# Patient Record
Sex: FEMALE | Race: WHITE | Employment: FULL TIME | URBAN - NONMETROPOLITAN AREA
[De-identification: names, ages, dates, MRNs, and addresses within clinical notes are randomized per-mention and may not be internally consistent; named-entity substitution may affect disease eponyms.]

---

## 2020-03-12 ENCOUNTER — HOSPITAL ENCOUNTER (OUTPATIENT)
Age: 45
Discharge: HOME | End: 2020-03-14
Payer: COMMERCIAL

## 2020-03-12 ENCOUNTER — HOSPITAL ENCOUNTER (OUTPATIENT)
Dept: GENERAL RADIOLOGY | Age: 45
Discharge: HOME | End: 2020-03-14
Payer: COMMERCIAL

## 2020-03-12 PROCEDURE — 73080 X-RAY EXAM OF ELBOW: CPT

## 2020-05-27 DIAGNOSIS — Z11.59 ENCOUNTER FOR SCREENING FOR OTHER VIRAL DISEASES: Primary | ICD-10-CM

## 2020-06-02 ENCOUNTER — HOSPITAL ENCOUNTER (OUTPATIENT)
Dept: PREADMISSION TESTING | Age: 45
Setting detail: SPECIMEN
Discharge: HOME | End: 2020-06-02
Payer: COMMERCIAL

## 2020-06-02 PROCEDURE — U0003 INFECTIOUS AGENT DETECTION BY NUCLEIC ACID (DNA OR RNA); SEVERE ACUTE RESPIRATORY SYNDROME CORONAVIRUS 2 (SARS-COV-2) (CORONAVIRUS DISEASE [COVID-19]), AMPLIFIED PROBE TECHNIQUE, MAKING USE OF HIGH THROUGHPUT TECHNOLOGIES AS DESCRIBED BY CMS-2020-01-R: HCPCS

## 2020-06-04 LAB — SARS-COV-2, NAA: NOT DETECTED

## 2020-06-05 ENCOUNTER — TELEPHONE (OUTPATIENT)
Dept: INTERNAL MEDICINE | Age: 45
End: 2020-06-05

## 2020-06-09 ENCOUNTER — HOSPITAL ENCOUNTER (OUTPATIENT)
Dept: SLEEP CENTER | Age: 45
Discharge: HOME | End: 2020-06-09
Payer: COMMERCIAL

## 2020-06-09 PROCEDURE — 95811 POLYSOM 6/>YRS CPAP 4/> PARM: CPT

## 2020-06-10 ASSESSMENT — SLEEP AND FATIGUE QUESTIONNAIRES
HOW LIKELY ARE YOU TO NOD OFF OR FALL ASLEEP WHEN YOU ARE A PASSENGER IN A CAR FOR AN HOUR WITHOUT A BREAK: 1
HOW LIKELY ARE YOU TO NOD OFF OR FALL ASLEEP WHILE LYING DOWN TO REST IN THE AFTERNOON WHEN CIRCUMSTANCES PERMIT: 1
NUMBER OF TIMES YOU WAKE PER NIGHT: 0
I SLEEP WELL: NO
ESS TOTAL SCORE: 11
NORMAL AMOUNT OF SLEEP PER NIGHT: 8
DO YOU SNORE: YES
FOR THE FIRST 30 MINUTES AFTER WAKING, I AM: SOMEWHAT DROWSY
HOW LIKELY ARE YOU TO NOD OFF OR FALL ASLEEP WHILE SITTING INACTIVE IN A PUBLIC PLACE: 3
HOW LIKELY ARE YOU TO NOD OFF OR FALL ASLEEP WHILE SITTING AND READING: 3
HOW MANY NAPS DO YOU TAKE PER WEEK: 2
HOW LIKELY ARE YOU TO NOD OFF OR FALL ASLEEP IN A CAR, WHILE STOPPED FOR A FEW MINUTES IN TRAFFIC: 0
USUAL AMOUNT OF TIME TO FALL ASLEEP (MIN): 2
WHAT TIME DO YOU USUALLY WAKE UP: 19800
HOW LIKELY ARE YOU TO NOD OFF OR FALL ASLEEP WHILE WATCHING TV: 2
HOW LIKELY ARE YOU TO NOD OFF OR FALL ASLEEP WHILE SITTING QUIETLY AFTER LUNCH WITHOUT ALCOHOL: 1
WHAT TIME DO YOU USUALLY GO TO BED: 36000
HOW LIKELY ARE YOU TO NOD OFF OR FALL ASLEEP WHILE SITTING AND TALKING TO SOMEONE: 0
DO YOU HAVE HIGH BLOOD PRESSURE: YES

## 2020-06-22 NOTE — PROGRESS NOTES
leading to an  arousal index of 26.4 per hour; 37 arousals were associated with  respiratory event, 2 arousals were associated with limb movement, and  there were 9 spontaneous arousals noted during this recording. LIMB MOVEMENT DATA:  There were 7 limb movements, corresponding to a  limb movement index of 3.9 per hour. EKG MONITORING:  Mean heart rate during wakefulness was 79 beats per  minute. IMPRESSION:  1. This patient does meet criteria for a severe obstructive sleep apnea  along with hypoxia and would benefit from CPAP titration. Therefore,  the patient had a split-night study. 2.  This patient would benefit from treatment of any other coexisting  medical condition including obesity, insomnia, anxiety or depression. 3.  Please correlate clinically. This is the titration polysomnogram portion of the study. SLEEP STAGING:  Total duration of this portion of the study was 256  minutes. Total sleep time was 216.7 minutes. This led to a sleep  efficiency of 84.7%. The patient had sleep latency in 7.7 minutes. Stage N1 sleep accounted for 7.2% of this portion of the study. Stage  N2 sleep accounted for 50.2% of this portion of the study, slow-wave  sleep accounted for 22.4% of this portion of the study, and REM sleep  accounted for 20.3% of this portion of the study. The latency to REM  sleep was 134 minutes. COMPREHENSIVE VENTILATORY MONITORING:  There were 7 central apneas, 2  obstructive apneas, no mixed apneas, and 19 hypopneas, leading to an  apnea-hypopnea index of 8 per hour. The average oxygenation while awake  was 99%, with minimum oxygen saturation down to 89%. AROUSAL DATA:  The patient had a total of 67 arousals leading to an  arousal index of 18.5 per hour; 16 arousals were associated with  respiratory event, 2 arousals were associated with limb movement, and  there were 47 spontaneous arousals noted during this recording.     LIMB MOVEMENT DATA:  There were a total of

## 2021-01-12 ENCOUNTER — HOSPITAL ENCOUNTER (OUTPATIENT)
Age: 46
Discharge: HOME | End: 2021-01-12
Payer: COMMERCIAL

## 2021-01-12 LAB
ANION GAP SERPL CALCULATED.3IONS-SCNC: 6 MMOL/L (ref 9–17)
BUN BLDV-MCNC: 19 MG/DL (ref 6–20)
BUN/CREAT BLD: 23 (ref 9–20)
CALCIUM SERPL-MCNC: 9.6 MG/DL (ref 8.6–10.4)
CHLORIDE BLD-SCNC: 104 MMOL/L (ref 98–107)
CHOLESTEROL/HDL RATIO: 4.6
CHOLESTEROL: 194 MG/DL
CO2: 28 MMOL/L (ref 20–31)
CREAT SERPL-MCNC: 0.81 MG/DL (ref 0.5–0.9)
GFR AFRICAN AMERICAN: >60 ML/MIN
GFR NON-AFRICAN AMERICAN: >60 ML/MIN
GFR SERPL CREATININE-BSD FRML MDRD: ABNORMAL ML/MIN/{1.73_M2}
GFR SERPL CREATININE-BSD FRML MDRD: ABNORMAL ML/MIN/{1.73_M2}
GLUCOSE BLD-MCNC: 113 MG/DL (ref 70–99)
HDLC SERPL-MCNC: 42 MG/DL
LDL CHOLESTEROL: 117 MG/DL (ref 0–130)
POTASSIUM SERPL-SCNC: 4.3 MMOL/L (ref 3.7–5.3)
SODIUM BLD-SCNC: 138 MMOL/L (ref 135–144)
TRIGL SERPL-MCNC: 175 MG/DL
TSH SERPL DL<=0.05 MIU/L-ACNC: 1.28 MIU/L (ref 0.3–5)
VLDLC SERPL CALC-MCNC: ABNORMAL MG/DL (ref 1–30)

## 2021-01-12 PROCEDURE — 84443 ASSAY THYROID STIM HORMONE: CPT

## 2021-01-12 PROCEDURE — 93005 ELECTROCARDIOGRAM TRACING: CPT | Performed by: FAMILY MEDICINE

## 2021-01-12 PROCEDURE — 80061 LIPID PANEL: CPT

## 2021-01-12 PROCEDURE — 80048 BASIC METABOLIC PNL TOTAL CA: CPT

## 2021-01-12 PROCEDURE — 36415 COLL VENOUS BLD VENIPUNCTURE: CPT

## 2021-01-13 LAB
EKG ATRIAL RATE: 75 BPM
EKG P AXIS: 63 DEGREES
EKG P-R INTERVAL: 138 MS
EKG Q-T INTERVAL: 384 MS
EKG QRS DURATION: 84 MS
EKG QTC CALCULATION (BAZETT): 428 MS
EKG R AXIS: 53 DEGREES
EKG T AXIS: 7 DEGREES
EKG VENTRICULAR RATE: 75 BPM

## 2021-01-13 PROCEDURE — 93010 ELECTROCARDIOGRAM REPORT: CPT | Performed by: INTERNAL MEDICINE

## 2022-01-06 ENCOUNTER — HOSPITAL ENCOUNTER (OUTPATIENT)
Dept: PREADMISSION TESTING | Age: 47
Setting detail: SPECIMEN
Discharge: HOME | End: 2022-01-06
Payer: COMMERCIAL

## 2022-01-06 LAB
DIRECT EXAM: NORMAL
DIRECT EXAM: NORMAL
Lab: NORMAL
SPECIMEN DESCRIPTION: NORMAL

## 2022-01-06 PROCEDURE — C9803 HOPD COVID-19 SPEC COLLECT: HCPCS

## 2022-01-06 PROCEDURE — U0005 INFEC AGEN DETEC AMPLI PROBE: HCPCS

## 2022-01-06 PROCEDURE — U0003 INFECTIOUS AGENT DETECTION BY NUCLEIC ACID (DNA OR RNA); SEVERE ACUTE RESPIRATORY SYNDROME CORONAVIRUS 2 (SARS-COV-2) (CORONAVIRUS DISEASE [COVID-19]), AMPLIFIED PROBE TECHNIQUE, MAKING USE OF HIGH THROUGHPUT TECHNOLOGIES AS DESCRIBED BY CMS-2020-01-R: HCPCS

## 2022-01-06 PROCEDURE — 87804 INFLUENZA ASSAY W/OPTIC: CPT

## 2022-01-07 LAB
SARS-COV-2: ABNORMAL
SARS-COV-2: DETECTED
SOURCE: ABNORMAL

## 2022-02-11 ENCOUNTER — HOSPITAL ENCOUNTER (EMERGENCY)
Age: 47
Discharge: HOME | End: 2022-02-11
Attending: EMERGENCY MEDICINE
Payer: COMMERCIAL

## 2022-02-11 VITALS
TEMPERATURE: 98.7 F | HEART RATE: 115 BPM | OXYGEN SATURATION: 95 % | DIASTOLIC BLOOD PRESSURE: 99 MMHG | HEIGHT: 66 IN | BODY MASS INDEX: 31.02 KG/M2 | RESPIRATION RATE: 19 BRPM | WEIGHT: 193 LBS | SYSTOLIC BLOOD PRESSURE: 150 MMHG

## 2022-02-11 DIAGNOSIS — N10 ACUTE PYELONEPHRITIS: Primary | ICD-10-CM

## 2022-02-11 LAB
-: ABNORMAL
AMORPHOUS: ABNORMAL
BACTERIA: ABNORMAL
BILIRUBIN URINE: NEGATIVE
CASTS UA: ABNORMAL /LPF
COLOR: YELLOW
COMMENT UA: ABNORMAL
CRYSTALS, UA: ABNORMAL /HPF
EPITHELIAL CELLS UA: ABNORMAL /HPF
GLUCOSE URINE: NEGATIVE
KETONES, URINE: NEGATIVE
LEUKOCYTE ESTERASE, URINE: ABNORMAL
MUCUS: ABNORMAL
NITRITE, URINE: POSITIVE
OTHER OBSERVATIONS UA: ABNORMAL
PH UA: 6 (ref 5–8)
PROTEIN UA: ABNORMAL
RBC UA: ABNORMAL /HPF (ref 0–2)
RENAL EPITHELIAL, UA: ABNORMAL /HPF
SPECIFIC GRAVITY UA: 1.01 (ref 1–1.03)
TRICHOMONAS: ABNORMAL
TURBIDITY: CLEAR
URINE HGB: ABNORMAL
UROBILINOGEN, URINE: NORMAL
WBC UA: ABNORMAL /HPF
YEAST: ABNORMAL

## 2022-02-11 PROCEDURE — 87088 URINE BACTERIA CULTURE: CPT

## 2022-02-11 PROCEDURE — 87086 URINE CULTURE/COLONY COUNT: CPT

## 2022-02-11 PROCEDURE — 6360000002 HC RX W HCPCS: Performed by: EMERGENCY MEDICINE

## 2022-02-11 PROCEDURE — 96372 THER/PROPH/DIAG INJ SC/IM: CPT

## 2022-02-11 PROCEDURE — 87186 SC STD MICRODIL/AGAR DIL: CPT

## 2022-02-11 PROCEDURE — 99283 EMERGENCY DEPT VISIT LOW MDM: CPT

## 2022-02-11 PROCEDURE — 6370000000 HC RX 637 (ALT 250 FOR IP): Performed by: EMERGENCY MEDICINE

## 2022-02-11 PROCEDURE — 81001 URINALYSIS AUTO W/SCOPE: CPT

## 2022-02-11 RX ORDER — CEFDINIR 300 MG/1
300 CAPSULE ORAL 2 TIMES DAILY
Qty: 20 CAPSULE | Refills: 0 | Status: SHIPPED | OUTPATIENT
Start: 2022-02-11 | End: 2022-02-21

## 2022-02-11 RX ORDER — ONDANSETRON 4 MG/1
4 TABLET, ORALLY DISINTEGRATING ORAL ONCE
Status: COMPLETED | OUTPATIENT
Start: 2022-02-11 | End: 2022-02-11

## 2022-02-11 RX ORDER — METHOCARBAMOL 750 MG/1
750 TABLET, FILM COATED ORAL 3 TIMES DAILY PRN
COMMUNITY

## 2022-02-11 RX ORDER — NAPROXEN 375 MG/1
375 TABLET ORAL 2 TIMES DAILY PRN
COMMUNITY
Start: 2021-05-11

## 2022-02-11 RX ORDER — MORPHINE SULFATE 4 MG/ML
6 INJECTION, SOLUTION INTRAMUSCULAR; INTRAVENOUS ONCE
Status: DISCONTINUED | OUTPATIENT
Start: 2022-02-11 | End: 2022-02-11

## 2022-02-11 RX ORDER — FAMOTIDINE 20 MG/1
20 TABLET, FILM COATED ORAL 2 TIMES DAILY
COMMUNITY

## 2022-02-11 RX ORDER — PROPRANOLOL HYDROCHLORIDE 10 MG/1
10 TABLET ORAL 2 TIMES DAILY
COMMUNITY

## 2022-02-11 RX ORDER — KETOROLAC TROMETHAMINE 30 MG/ML
30 INJECTION, SOLUTION INTRAMUSCULAR; INTRAVENOUS ONCE
Status: COMPLETED | OUTPATIENT
Start: 2022-02-11 | End: 2022-02-11

## 2022-02-11 RX ORDER — DEXTROAMPHETAMINE SACCHARATE, AMPHETAMINE ASPARTATE MONOHYDRATE, DEXTROAMPHETAMINE SULFATE AND AMPHETAMINE SULFATE 6.25; 6.25; 6.25; 6.25 MG/1; MG/1; MG/1; MG/1
25 CAPSULE, EXTENDED RELEASE ORAL DAILY
COMMUNITY
Start: 2021-09-22

## 2022-02-11 RX ORDER — ALBUTEROL SULFATE 90 UG/1
2 AEROSOL, METERED RESPIRATORY (INHALATION) EVERY 6 HOURS PRN
COMMUNITY

## 2022-02-11 RX ORDER — CEFDINIR 300 MG/1
300 CAPSULE ORAL ONCE
Status: COMPLETED | OUTPATIENT
Start: 2022-02-11 | End: 2022-02-11

## 2022-02-11 RX ORDER — HYDROCODONE BITARTRATE AND ACETAMINOPHEN 5; 325 MG/1; MG/1
1 TABLET ORAL EVERY 6 HOURS PRN
Qty: 12 TABLET | Refills: 0 | Status: SHIPPED | OUTPATIENT
Start: 2022-02-11 | End: 2022-02-14

## 2022-02-11 RX ORDER — ORPHENADRINE CITRATE 30 MG/ML
60 INJECTION INTRAMUSCULAR; INTRAVENOUS ONCE
Status: COMPLETED | OUTPATIENT
Start: 2022-02-11 | End: 2022-02-11

## 2022-02-11 RX ORDER — ONDANSETRON 4 MG/1
4 TABLET, FILM COATED ORAL EVERY 8 HOURS PRN
COMMUNITY
Start: 2021-12-17

## 2022-02-11 RX ORDER — KETOROLAC TROMETHAMINE 10 MG/1
10 TABLET, FILM COATED ORAL EVERY 8 HOURS PRN
Qty: 15 TABLET | Refills: 0 | Status: SHIPPED | OUTPATIENT
Start: 2022-02-11

## 2022-02-11 RX ORDER — ESTRADIOL 0.5 MG/1
2 TABLET ORAL DAILY
COMMUNITY

## 2022-02-11 RX ORDER — METOPROLOL SUCCINATE 100 MG/1
100 TABLET, EXTENDED RELEASE ORAL DAILY
COMMUNITY
Start: 2021-07-21

## 2022-02-11 RX ADMIN — CEFDINIR 300 MG: 300 CAPSULE ORAL at 15:13

## 2022-02-11 RX ADMIN — ONDANSETRON 4 MG: 4 TABLET, ORALLY DISINTEGRATING ORAL at 13:32

## 2022-02-11 RX ADMIN — KETOROLAC TROMETHAMINE 30 MG: 30 INJECTION, SOLUTION INTRAMUSCULAR at 13:31

## 2022-02-11 RX ADMIN — HYDROMORPHONE HYDROCHLORIDE 1 MG: 1 INJECTION, SOLUTION INTRAMUSCULAR; INTRAVENOUS; SUBCUTANEOUS at 15:13

## 2022-02-11 RX ADMIN — ORPHENADRINE CITRATE 60 MG: 30 INJECTION INTRAMUSCULAR; INTRAVENOUS at 13:32

## 2022-02-11 ASSESSMENT — PAIN SCALES - GENERAL
PAINLEVEL_OUTOF10: 10
PAINLEVEL_OUTOF10: 10

## 2022-02-11 NOTE — LETTER
[facility] ED  [address], [address]  Phone: [phone]               February 11, 2022    Patient: Angi Peres   YOB: 1975   Date of Visit: 2/11/2022       To Whom It May Concern:    Alirio Kc was seen and treated in our emergency department on 2/11/2022. She may return to work on 2/15/2022.       Sincerely,       Eitan Agarwal RN         Signature:__________________________________

## 2022-02-11 NOTE — ED PROVIDER NOTES
Urinalysis Comments         Microscopic Urinalysis   Result Value Ref Range    -          WBC, UA 10 TO 20 0 /HPF    RBC, UA 0 TO 2 0 - 2 /HPF    Casts UA NOT REPORTED /LPF    Crystals, UA NOT REPORTED None /HPF    Epithelial Cells UA 5 TO 10 /HPF    Renal Epithelial, UA 0 TO 2 0 /HPF    Bacteria, UA 3+ (A) None    Mucus, UA 1+ (A) None    Trichomonas, UA NOT REPORTED None    Amorphous, UA NOT REPORTED None    Other Observations UA NOT REPORTED NOT REQ. Yeast, UA NOT REPORTED None       RADIOLOGY:  Interpreted by Radiologist.  No orders to display       ------------------------- NURSING NOTES AND VITALS REVIEWED ---------------------------   The nursing notes within the ED encounter and vital signs as below have been reviewed. BP (!) 150/99   Pulse 115   Temp 98.7 °F (37.1 °C) (Oral)   Resp 19   Ht 5' 6\" (1.676 m)   Wt 193 lb (87.5 kg)   SpO2 95%   BMI 31.15 kg/m²   Oxygen Saturation Interpretation: Normal      ---------------------------------------------------PHYSICAL EXAM--------------------------------------      Constitutional/General: Alert and oriented x3, well appearing, non toxic in mild distress secondary to pain  Head: NC/AT  Eyes: PERRL, EOMI  Mouth: Oropharynx clear, handling secretions, no trismus  Neck: Supple, full ROM, no meningeal signs  Pulmonary: Lungs clear to auscultation bilaterally, no wheezes, rales, or rhonchi. Not in respiratory distress  Cardiovascular:  Regular rate and rhythm, no murmurs, gallops, or rubs. 2+ distal pulses  Abdomen: Soft, non tender, non distended,   Extremities: Moves all extremities x 4. Warm and well perfused  Skin: warm and dry without rash  Neurologic: GCS 15,  Psych: Normal Affect  Low back:  Moderate left-sided tenderness to palpation lumbar paraspinal with spasm    ------------------------------ ED COURSE/MEDICAL DECISION MAKING----------------------  Medications   HYDROmorphone (DILAUDID) injection 1 mg (has no administration in time range) cefdinir (OMNICEF) capsule 300 mg (has no administration in time range)   ketorolac (TORADOL) injection 30 mg (30 mg IntraMUSCular Given 2/11/22 1331)   orphenadrine (NORFLEX) injection 60 mg (60 mg IntraMUSCular Given 2/11/22 1332)   ondansetron (ZOFRAN-ODT) disintegrating tablet 4 mg (4 mg Oral Given 2/11/22 1332)         Medical Decision Making:    Suspected lumbar strain    Counseling: The emergency provider has spoken with the patient and discussed todays results, in addition to providing specific details for the plan of care and counseling regarding the diagnosis and prognosis. Questions are answered at this time and they are agreeable with the plan.      --------------------------------- IMPRESSION AND DISPOSITION ---------------------------------    IMPRESSION  1.  Acute pyelonephritis New Problem       DISPOSITION  Disposition: Discharge to home  Patient condition is stable                  Verner Fleischer, MD  02/11/22 4540

## 2022-02-13 LAB
CULTURE: ABNORMAL
Lab: ABNORMAL
SPECIMEN DESCRIPTION: ABNORMAL

## 2022-04-14 ENCOUNTER — HOSPITAL ENCOUNTER (OUTPATIENT)
Dept: VASCULAR LAB | Age: 47
Discharge: HOME | End: 2022-04-16
Payer: COMMERCIAL

## 2022-04-14 DIAGNOSIS — I83.893 VARICOSE VEINS OF BILATERAL LOWER EXTREMITIES WITH OTHER COMPLICATIONS: ICD-10-CM

## 2022-04-14 PROCEDURE — 93970 EXTREMITY STUDY: CPT

## 2022-10-27 ENCOUNTER — HOSPITAL ENCOUNTER (OUTPATIENT)
Dept: PHYSICAL THERAPY | Age: 47
Setting detail: THERAPIES SERIES
Discharge: HOME | End: 2022-10-27
Payer: COMMERCIAL

## 2022-10-27 PROCEDURE — 97162 PT EVAL MOD COMPLEX 30 MIN: CPT

## 2022-10-27 ASSESSMENT — PAIN SCALES - GENERAL: PAINLEVEL_OUTOF10: 4

## 2022-10-28 NOTE — PLAN OF CARE
[facility]       Phone: [phone]   Date: 10/28/2022                      Outpatient Physical Therapy  Fax: [phone]    ACCT #: [de-identified]                     Plan of Care  [facility]#: [id number]  Patient: Reggie Pope  : 1975    Referring Provider: Dr. Yung Gonzales    Diagnosis: Back pain     Treatment Diagnosis: Right lumbar/hip pain with radiculopathy    Assessment: Patient presents with right hip/lumbar pain with radicular right medial calf pain. Plan  to progress with hip and trunk stretching and strengthening as well as manual therapy and/or pelvic traction  Therapy Prognosis: Good    Treatment Plan :   Days: 2 times per week Weeks: 8 weeks      Patient Education/HEP, Therapeutic Exercise, Manual Therapy: Myofacial Release/Cupping, Traction, and HP/CP    Goals:  Time Frame for Short Term Goals: 5 visits  Short Term Goal 1: Educate on home program for hip and LE stretching and core stab ex    Time Frame for Long Term Goals : 12 visits  Long Term Goal 1: Decrease subjective right hip/lumbar pain to < 3/10 with standing and sitting activities  Long Term Goal 2: Decrease subjective frequency and/or severity of right LE radicular calf pain by > 50 %     MICHELLE FLEMING PT   Date: 10/28/2022    ______________________________________ Date: 10/28/2022  Physician Signature  By signing above or cosigning electronically, I have reviewed this Plan of Care and certify a need for medically necessary rehabilitation services.

## 2022-10-28 NOTE — THERAPY EVALUATION
Phone: [address] [facility]         Fax: [phone]                      Outpatient Physical Therapy                                                                      Evaluation    Date: 10/28/2022  Patient: Rafita Mendez  : 1975  ACCT #: [de-identified]    Referring Provider : Dr. Marce Gee    Diagnosis: Back pain    Treatment Diagnosis: Right lumbar/hip pain with radiculopathy  PT Insurance Information: Trevor Osler    Per Physician Order  Total # of Visits to Date: 1  No Show: 0  Canceled Appointment: 0     Subjective     Additional Pertinent Hx: Chronic LBP  with 1 year onset of right LE medial calf pain/numbness and toes turning red. Has seen a variety of physicians without cause found. Pain intermittent but increases with any position of standing, walking or sitting. Also notes right hip/gluteal pain with intermitten radiating pain to right groin region. PMHX incldues SOB, HTN, asthma, arthritis, headaches, learning issues  Oswestry = 21/50   Attempting to get MRI  Pain Assessment  Pain Level: 4     IADL History  Active : Yes  Occupation: Full time employment  Type of occupation: [employer] - sitting majority of day however can occasionally move around  Leisure & Hobbies: Completes most household tasks but takes extra breaks;  mowing and moppin most involved    Objective  Vision  Vision: Within Functional Limits  Hearing  Hearing: Within functional limits  Observation/Palpation  Posture: Fair  Palpation: Mild tenderness of right SI region  Observation: No asymetry of iliac crest or noted LLD in supine  Spine  Lumbar:  Forward flex to touch knees with discomfort;  SB and rotations WFL and essentially pain-free  Strength RLE  Strength RLE: WFL  Comment: No myotomal weakness  AROM RLE (degrees)  RLE AROM: WFL  Strength LLE  Strength LLE: WFL  Comment: No myotomal weakness  AROM LLE (degrees)  LLE AROM : WFL       AROM RLE (degrees)  RLE AROM: WFL  AROM LLE

## 2022-11-01 ENCOUNTER — HOSPITAL ENCOUNTER (OUTPATIENT)
Dept: PHYSICAL THERAPY | Age: 47
Setting detail: THERAPIES SERIES
Discharge: HOME | End: 2022-11-01
Payer: COMMERCIAL

## 2022-11-01 PROCEDURE — 97012 MECHANICAL TRACTION THERAPY: CPT

## 2022-11-01 PROCEDURE — 97110 THERAPEUTIC EXERCISES: CPT

## 2022-11-01 ASSESSMENT — PAIN SCALES - GENERAL: PAINLEVEL_OUTOF10: 3

## 2022-11-01 NOTE — PROGRESS NOTES
Physical Therapy    Phone: Chela Maldonado      Fax: [phone]                            Outpatient Physical Therapy                                                                            Daily Note    Date: 2022  Patient Name: Delma Hernandez        MRN: [id number]   ACCT#:  [de-identified]  : 1975  (52 y.o.)    Referring Provider (secondary): Dr. Cristela Stephenson         Diagnosis: Back pain  Treatment Diagnosis: Right lumbar/hip pain with radiculopathy    PT Insurance Information: Cathryn Goetz    Per Physician Order  Total # of Visits to Date: 2  No Show: 0  Canceled Appointment: 0  Plan of Care/Certification Expiration Date: 12/15/22    Pre-Treatment Pain:  3/10     Assessment  Assessment: Patient reports last night was bad and had difficutly sleeping. Currently states toes are burning but this is normal and rates this pain as 3/10. Due to time constraits today reviewed HEP and added exercises as outlined above. Held manual but progressed with intermittent pelvic traction. Will add maual next visit to help with hip issues.     Plan  Continue with current plan of care    Exercises/Modalities/Manual:  See DocFlow Sheet    Education:           Goals  (Total # of Visits to Date: 2)   Short Term Goals  Time Frame for Short Term Goals: 5 visits  Short Term Goal 1: Educate on home program for hip and LE stretching and core stab ex    Long Term Goals  Time Frame for Long Term Goals : 12 visits  Long Term Goal 1: Decrease subjective right hip/lumbar pain to < 3/10 with standing and sitting activities  Long Term Goal 2: Decrease subjective frequency and/or severity of right LE radicular calf pain by > 50 %    Post Treatment Pain:  5/10    Time In: 08    Time Out : 903         Total Time: 22 Minutes    Total Treatment Time: 32 Minutes    Antonette Ybarra PTA    Date: 2022

## 2022-11-03 ENCOUNTER — HOSPITAL ENCOUNTER (OUTPATIENT)
Dept: PHYSICAL THERAPY | Age: 47
Setting detail: THERAPIES SERIES
Discharge: HOME | End: 2022-11-03
Payer: COMMERCIAL

## 2022-11-03 PROCEDURE — 97110 THERAPEUTIC EXERCISES: CPT

## 2022-11-03 PROCEDURE — 97140 MANUAL THERAPY 1/> REGIONS: CPT

## 2022-11-03 PROCEDURE — 97012 MECHANICAL TRACTION THERAPY: CPT

## 2022-11-03 ASSESSMENT — PAIN SCALES - GENERAL: PAINLEVEL_OUTOF10: 3

## 2022-11-03 NOTE — PROGRESS NOTES
Physical Therapy  Phone: Chela Maldonado      Fax: [phone]                            Outpatient Physical Therapy                                                                            Daily Note    Date: 11/3/2022  Patient Name: Kash Yan        MRN: [id number]   ACCT#:  [de-identified]  : 1975  (52 y.o.)    Referring Provider (secondary): Dr. Thomas Bennett         Diagnosis: Back pain  Treatment Diagnosis: Right lumbar/hip pain with radiculopathy    PT Insurance Information: Niles Krueger    Per Physician Order  Total # of Visits to Date: 3  No Show: 0  Canceled Appointment: 0  Plan of Care/Certification Expiration Date: 12/15/22    Pre-Treatment Pain:  3/10     Assessment  Assessment: Patient rates pain today as 3/10. Reports being in pain since previous visit. Completes exercises as outlined above. Began manual therapy to R hip prior to traction due to the fact that the pelvic traction is not effective on the hip issues. Decreased pull on traction to 45# x 10 minutes.     Plan  Continue with current plan of care    Exercises/Modalities/Manual:  See DocFlow Sheet    Education:           Goals  (Total # of Visits to Date: 3)   Short Term Goals  Time Frame for Short Term Goals: 5 visits  Short Term Goal 1: Educate on home program for hip and LE stretching and core stab ex    Long Term Goals  Time Frame for Long Term Goals : 12 visits  Long Term Goal 1: Decrease subjective right hip/lumbar pain to < 3/10 with standing and sitting activities  Long Term Goal 2: Decrease subjective frequency and/or severity of right LE radicular calf pain by > 50 %    Post Treatment Pain:  3/10    Time In: 0826    Time Out : 0910        Timed Code Treatment Minutes: 34 Minutes    Total Treatment Time: 40 Minutes    Kamille Ramirez PTA   Date: 11/3/2022

## 2022-11-07 ENCOUNTER — HOSPITAL ENCOUNTER (OUTPATIENT)
Dept: PHYSICAL THERAPY | Age: 47
Setting detail: THERAPIES SERIES
Discharge: HOME | End: 2022-11-07
Payer: COMMERCIAL

## 2022-11-07 PROCEDURE — 97012 MECHANICAL TRACTION THERAPY: CPT

## 2022-11-07 PROCEDURE — 97110 THERAPEUTIC EXERCISES: CPT

## 2022-11-07 PROCEDURE — 97140 MANUAL THERAPY 1/> REGIONS: CPT

## 2022-11-07 ASSESSMENT — PAIN SCALES - GENERAL: PAINLEVEL_OUTOF10: 3

## 2022-11-07 ASSESSMENT — PAIN DESCRIPTION - LOCATION: LOCATION: BACK

## 2022-11-07 NOTE — PROGRESS NOTES
Physical Therapy  Phone: Chela Maldonado      Fax: [phone]                            Outpatient Physical Therapy                                                                            Daily Note    Date: 2022  Patient Name: Ramakrishna Reinoso        MRN: [id number]   ACCT#:  [de-identified]  : 1975  (52 y.o.)    Referring Provider (secondary): Dr. Abril Goodman         Diagnosis: Back pain  Treatment Diagnosis: Right lumbar/hip pain with radiculopathy    PT Insurance Information: Kwesi Hall    Per Physician Order  Total # of Visits to Date: 4  No Show: 0  Canceled Appointment: 0  Plan of Care/Certification Expiration Date: 12/15/22    Pre-Treatment Pain:  3/10     Assessment  Assessment: Patient rates pain in back as 3/10. C/o pain in R medial lower leg rating this pain as 6/10. Pain began last night and not sure why. Completes exercises as stated above. Manual therapy to R hip prior to pelvic traction due to traction is not effective on R hip  issues.     Plan  Continue with current plan of care    Exercises/Modalities/Manual:  See DocFlow Sheet    Education:           Goals  (Total # of Visits to Date: 4)   Short Term Goals  Time Frame for Short Term Goals: 5 visits  Short Term Goal 1: Educate on home program for hip and LE stretching and core stab ex    Long Term Goals  Time Frame for Long Term Goals : 12 visits  Long Term Goal 1: Decrease subjective right hip/lumbar pain to < 3/10 with standing and sitting activities  Long Term Goal 2: Decrease subjective frequency and/or severity of right LE radicular calf pain by > 50 %    Post Treatment Pain:  1/10    Time In: 0911    Time Out : 5444        Timed Code Treatment Minutes: 26 Minutes    Total Treatment Time: 39 Minutes    Marko Levi PTA    Date: 2022

## 2022-11-09 ENCOUNTER — HOSPITAL ENCOUNTER (OUTPATIENT)
Dept: PHYSICAL THERAPY | Age: 47
Setting detail: THERAPIES SERIES
Discharge: HOME | End: 2022-11-09
Payer: COMMERCIAL

## 2022-11-09 PROCEDURE — 97012 MECHANICAL TRACTION THERAPY: CPT

## 2022-11-09 PROCEDURE — 97140 MANUAL THERAPY 1/> REGIONS: CPT

## 2022-11-09 PROCEDURE — 97110 THERAPEUTIC EXERCISES: CPT

## 2022-11-09 ASSESSMENT — PAIN SCALES - GENERAL: PAINLEVEL_OUTOF10: 3

## 2022-11-09 NOTE — PROGRESS NOTES
Physical Therapy  Phone: Chela Maldonado      Fax: [phone]                            Outpatient Physical Therapy                                                                            Daily Note    Date: 2022  Patient Name: Tasia Woods        MRN: [id number]   ACCT#:  [de-identified]  : 1975  (52 y.o.)    Referring Provider (secondary): Dr. Yolanda Denney         Diagnosis: Back pain  Treatment Diagnosis: Right lumbar/hip pain with radiculopathy    PT Insurance Information: Moris Carroll 150    Per Physician Order  Total # of Visits to Date: 5  No Show: 0  Canceled Appointment: 0  Plan of Care/Certification Expiration Date: 12/15/22    Pre-Treatment Pain:  3/10     Assessment  Assessment: Patient rates pain today as 3/10. Reports pain in her back can get up to 6-7/10. Continued with exercises as outlined above and added bridges and LTR to HEP. Manual therapy to R hip prior to pelvic traction due to traction is not effective on R hip issues. States  she is not really having pain following session but feels as if her hip is out. Will try to check next session.     Plan  Continue with current plan of care    Exercises/Modalities/Manual:  See DocFlow Sheet    Education: Issued HEP of bridges and LTR          Goals  (Total # of Visits to Date: 5)   Short Term Goals  Time Frame for Short Term Goals: 5 visits  Short Term Goal 1: Educate on home program for hip and LE stretching and core stab ex-MET    Long Term Goals  Time Frame for Long Term Goals : 12 visits  Long Term Goal 1: Decrease subjective right hip/lumbar pain to < 3/10 with standing and sitting activities  Long Term Goal 2: Decrease subjective frequency and/or severity of right LE radicular calf pain by > 50 %    Post Treatment Pain:  1/10    Time In: 0835    Time Out : 0920        Timed Code Treatment Minutes: 30 Minutes    Total Treatment Time: 45 Minutes    Ivet Logan     Date: 2022

## 2022-11-14 ENCOUNTER — HOSPITAL ENCOUNTER (OUTPATIENT)
Dept: PHYSICAL THERAPY | Age: 47
Setting detail: THERAPIES SERIES
Discharge: HOME | End: 2022-11-14
Payer: COMMERCIAL

## 2022-11-14 PROCEDURE — 97012 MECHANICAL TRACTION THERAPY: CPT

## 2022-11-14 PROCEDURE — 97110 THERAPEUTIC EXERCISES: CPT

## 2022-11-14 PROCEDURE — 97140 MANUAL THERAPY 1/> REGIONS: CPT

## 2022-11-14 ASSESSMENT — PAIN SCALES - GENERAL: PAINLEVEL_OUTOF10: 3

## 2022-11-14 NOTE — PROGRESS NOTES
Physical Therapy    Phone: Chela Maldonado      Fax: [phone]                            Outpatient Physical Therapy                                                                            Daily Note    Date: 2022  Patient Name: Phillip Elliott        MRN: [id number]   ACCT#:  [de-identified]  : 1975  (52 y.o.)    Referring Provider (secondary): Dr. Syd Spaulding         Diagnosis: Back pain  Treatment Diagnosis: Right lumbar/hip pain with radiculopathy    PT Insurance Information: Tequila Ferreira    Per Physician Order  Total # of Visits to Date: 6  No Show: 0  Canceled Appointment: 0  Plan of Care/Certification Expiration Date: 12/15/22    Pre-Treatment Pain:  3/10     Assessment  Assessment: Patient rates pain today in back as 3/10. Rates pain and radiating pain into R calf yesterday was horrible. Not close yet to achieving LTG #2. Continued with exercises as stated followed by manual therapy to R hip prior to pelvic traction due to traction is not effective on R hip issues. Following traction, patient states that she is walking faster. Continues to rate pain as 3/10.     Plan  Continue with current plan of care    Exercises/Modalities/Manual:  See DocFlow Sheet    Education:           Goals  (Total # of Visits to Date: 6)   Short Term Goals  Time Frame for Short Term Goals: 5 visits  Short Term Goal 1: Educate on home program for hip and LE stretching and core stab ex-MET    Long Term Goals  Time Frame for Long Term Goals : 12 visits  Long Term Goal 1: Decrease subjective right hip/lumbar pain to < 3/10 with standing and sitting activities  Long Term Goal 2: Decrease subjective frequency and/or severity of right LE radicular calf pain by > 50 %    Post Treatment Pain:  3/10    Time In: 0834    Time Out : 0911        Timed Code Treatment Minutes: 23 Minutes    Total Treatment Time:  38 Minutes    Suzi Bates     Date: 2022

## 2022-11-21 ENCOUNTER — HOSPITAL ENCOUNTER (OUTPATIENT)
Dept: PHYSICAL THERAPY | Age: 47
Setting detail: THERAPIES SERIES
Discharge: HOME | End: 2022-11-21
Payer: COMMERCIAL

## 2022-11-21 PROCEDURE — 97012 MECHANICAL TRACTION THERAPY: CPT

## 2022-11-21 PROCEDURE — 97110 THERAPEUTIC EXERCISES: CPT

## 2022-11-21 ASSESSMENT — PAIN SCALES - GENERAL: PAINLEVEL_OUTOF10: 5

## 2022-11-21 NOTE — PROGRESS NOTES
Phone: Chela Maldonado      Fax: [phone]                            Outpatient Physical Therapy                                                                            Daily Note    Date: 2022  Patient Name: Binh Armendariz        MRN: [id number]   ACCT#:  [de-identified]  : 1975  (52 y.o.)    Referring Provider: Dr. Ubaldo Elam         Diagnosis: Back pain  Treatment Diagnosis: Right lumbar/hip pain with radiculopathy    PT Insurance Information: Kevin Antonias    Per Physician Order  Total # of Visits to Date: 7  No Show: 0  Canceled Appointment: 0  Plan of Care/Certification Expiration Date: 12/15/22    Pre-Treatment Pain:  5/10     Assessment  Assessment: Patient has completed 7 treatment sessions consisting of exercise/education on home program along with manual therapy and pelvic traction for lumbar decompression. She presents with right LS/hip pain with radiculopathy extending to the right foot. Overall, she reports no significant reduction in symptoms. Her pain is rated 5-9/10 with daily or work tasks. She is limited with lifting, cleaning/running sweeper, climbing or squatting. She also notes signficant sleep depreivation due to pain. Reflexed are decreased at L4 bilaterally. Based on her limtied improvement or reduction of symptoms, plan to hold/discharge further PT and refer back to MD for further diagnostic testing and/or treatment.     Plan  Hold pending MD assessment    Exercises/Modalities/Manual:  See DocFlow Sheet    Education:           Goals  (Total # of Visits to Date: 7)   Short Term Goals  Time Frame for Short Term Goals: 5 visits  Short Term Goal 1: Educate on home program for hip and LE stretching and core stab ex-MET    Long Term Goals  Time Frame for Long Term Goals : 12 visits  Long Term Goal 1: Decrease subjective right hip/lumbar pain to < 3/10 with standing and sitting activities-  NOT MET  Long Term Goal 2: Decrease subjective frequency and/or severity of right LE radicular calf pain by > 50 %- NOT MET    Post Treatment Pain:  4-5/10    Time In: 0815    Time Out : 0900        Timed Code Treatment Minutes: 20 Minutes    Total Treatment:  [address], PT     Date: 11/21/2022

## 2022-11-21 NOTE — PROGRESS NOTES
Phone: [address]            Fax: [phone]                             Outpatient Physical Therapy                                                                      Progress Report    Date: 2022   MRN: [id number]    ACCT#: [de-identified]  Patient: Perico Roberto  : 1975  Referring Provider: Dr. Slava Gant         Diagnosis: Back pain      Treatment Diagnosis: Right lumbar/hip pain with radiculopathy    PT Insurance Information: Moris Carroll 150    Per Physician Order  Total # of Visits to Date: 7  No Show: 0  Canceled Appointment: 0    Assessment: Patient has completed 7 treatment sessions consisting of exercise/education on home program along with manual therapy and pelvic traction for lumbar decompression. She presents with right LS/hip pain with radiculopathy extending to the right foot. Overall, she reports no significant reduction in symptoms. Her pain is rated 5-9/10 with daily or work tasks. She is limited with standing, bending/lifting, cleaning/running sweeper, climbing or squatting. She also notes signficant sleep deprivation due to pain. Reflexed are decreased at L4 bilaterally. Based on her limted improvement or reduction of symptoms, plan to hold/discharge further PT and refer back to MD for further diagnostic testing and/or treatment.       Plan:   Hold pending MD assessment    Goals  Short Term Goals  Time Frame for Short Term Goals: 5 visits  Short Term Goal 1: Educate on home program for hip and LE stretching and core stab ex-MET    Long Term Goals  Time Frame for Long Term Goals : 12 visits  Long Term Goal 1: Decrease subjective right hip/lumbar pain to < 3/10 with standing and sitting activities-  NOT MET  Long Term Goal 2: Decrease subjective frequency and/or severity of right LE radicular calf pain by > 50 %- NOT MET    MICHELLE FLEMING, PT    Date: 2022

## 2023-02-06 ENCOUNTER — HOSPITAL ENCOUNTER (EMERGENCY)
Age: 48
Discharge: HOME | End: 2023-02-06
Attending: EMERGENCY MEDICINE
Payer: COMMERCIAL

## 2023-02-06 ENCOUNTER — APPOINTMENT (OUTPATIENT)
Dept: GENERAL RADIOLOGY | Age: 48
End: 2023-02-06
Payer: COMMERCIAL

## 2023-02-06 VITALS
HEIGHT: 66 IN | DIASTOLIC BLOOD PRESSURE: 77 MMHG | OXYGEN SATURATION: 96 % | SYSTOLIC BLOOD PRESSURE: 117 MMHG | HEART RATE: 58 BPM | BODY MASS INDEX: 31.34 KG/M2 | WEIGHT: 195 LBS | RESPIRATION RATE: 14 BRPM | TEMPERATURE: 98.6 F

## 2023-02-06 DIAGNOSIS — J18.9 PNEUMONIA OF LEFT LOWER LOBE DUE TO INFECTIOUS ORGANISM: Primary | ICD-10-CM

## 2023-02-06 LAB
ABSOLUTE BANDS #: 0.1 K/UL (ref 0–1)
ABSOLUTE EOS #: 1.92 K/UL (ref 0–0.4)
ABSOLUTE LYMPH #: 2.4 K/UL (ref 1–4.8)
ABSOLUTE MONO #: 0.96 K/UL (ref 0–1)
ALBUMIN SERPL-MCNC: 3.9 G/DL (ref 3.5–5.2)
ALP SERPL-CCNC: 110 U/L (ref 35–104)
ALT SERPL-CCNC: 20 U/L (ref 5–33)
ANION GAP SERPL CALCULATED.3IONS-SCNC: 11 MMOL/L (ref 9–17)
AST SERPL-CCNC: 13 U/L
BANDS: 1 % (ref 0–10)
BASOPHILS # BLD: ABNORMAL % (ref 0–2)
BASOPHILS ABSOLUTE: ABNORMAL K/UL (ref 0–0.2)
BILIRUB SERPL-MCNC: 0.1 MG/DL (ref 0.3–1.2)
BUN SERPL-MCNC: 15 MG/DL (ref 6–20)
CALCIUM SERPL-MCNC: 9.3 MG/DL (ref 8.6–10.4)
CHLORIDE SERPL-SCNC: 103 MMOL/L (ref 98–107)
CO2 SERPL-SCNC: 27 MMOL/L (ref 20–31)
CREAT SERPL-MCNC: 0.78 MG/DL (ref 0.5–0.9)
D DIMER BLD IA.RAPID-MCNC: 0.53 MG/L FEU (ref 0–0.59)
EOSINOPHILS RELATIVE PERCENT: 20 % (ref 0–5)
GFR SERPL CREATININE-BSD FRML MDRD: >60 ML/MIN/1.73M2
GLUCOSE SERPL-MCNC: 135 MG/DL (ref 70–99)
HCT VFR BLD AUTO: 41.2 % (ref 36–46)
HGB BLD-MCNC: 13.8 G/DL (ref 12–16)
LYMPHOCYTES # BLD: 25 % (ref 15–40)
MCH RBC QN AUTO: 31 PG (ref 26–34)
MCHC RBC AUTO-ENTMCNC: 33.6 G/DL (ref 31–37)
MCV RBC AUTO: 92.3 FL (ref 80–100)
MONOCYTES # BLD: 10 % (ref 4–8)
MORPHOLOGY: ABNORMAL
PDW BLD-RTO: 12.9 % (ref 12.1–15.2)
PLATELET # BLD AUTO: 347 K/UL (ref 140–450)
POTASSIUM SERPL-SCNC: 4.4 MMOL/L (ref 3.7–5.3)
PROT SERPL-MCNC: 6.7 G/DL (ref 6.4–8.3)
RBC # BLD: 4.46 M/UL (ref 4–5.2)
SEG NEUTROPHILS: 44 % (ref 47–75)
SEGMENTED NEUTROPHILS ABSOLUTE COUNT: 4.22 K/UL (ref 2.5–7)
SODIUM SERPL-SCNC: 141 MMOL/L (ref 135–144)
TROPONIN I SERPL DL<=0.01 NG/ML-MCNC: 20 NG/L (ref 0–14)
TROPONIN I SERPL DL<=0.01 NG/ML-MCNC: 21 NG/L (ref 0–14)
WBC # BLD AUTO: 9.6 K/UL (ref 3.5–11)

## 2023-02-06 PROCEDURE — 93005 ELECTROCARDIOGRAM TRACING: CPT | Performed by: EMERGENCY MEDICINE

## 2023-02-06 PROCEDURE — 99284 EMERGENCY DEPT VISIT MOD MDM: CPT

## 2023-02-06 PROCEDURE — 85025 COMPLETE CBC W/AUTO DIFF WBC: CPT

## 2023-02-06 PROCEDURE — 84484 ASSAY OF TROPONIN QUANT: CPT

## 2023-02-06 PROCEDURE — 80053 COMPREHEN METABOLIC PANEL: CPT

## 2023-02-06 PROCEDURE — 36415 COLL VENOUS BLD VENIPUNCTURE: CPT

## 2023-02-06 PROCEDURE — 85379 FIBRIN DEGRADATION QUANT: CPT

## 2023-02-06 PROCEDURE — 71045 X-RAY EXAM CHEST 1 VIEW: CPT

## 2023-02-06 PROCEDURE — 99285 EMERGENCY DEPT VISIT HI MDM: CPT

## 2023-02-06 RX ORDER — LEVOFLOXACIN 500 MG/1
500 TABLET, FILM COATED ORAL DAILY
Qty: 10 TABLET | Refills: 0 | Status: SHIPPED | OUTPATIENT
Start: 2023-02-06 | End: 2023-02-16

## 2023-02-06 RX ORDER — CETIRIZINE HYDROCHLORIDE 10 MG/1
10 TABLET ORAL DAILY
COMMUNITY
Start: 2019-08-08

## 2023-02-06 ASSESSMENT — LIFESTYLE VARIABLES
HOW OFTEN DO YOU HAVE A DRINK CONTAINING ALCOHOL: NEVER
HOW MANY STANDARD DRINKS CONTAINING ALCOHOL DO YOU HAVE ON A TYPICAL DAY: PATIENT DOES NOT DRINK

## 2023-02-06 NOTE — LETTER
[facility]OSP [facility] ED  [address], [address]  Phone: [phone]               February 6, 2023    Patient: Lewis Solano   YOB: 1975   Date of Visit: 2/6/2023       To Whom It May Concern:    Brandy Santos was seen and treated in our emergency department on 2/6/2023. She may return to work on 2/10/23.       Sincerely,       Rich Tavarez RN         Signature:__________________________________

## 2023-02-06 NOTE — DISCHARGE INSTRUCTIONS
Take antibiotic as prescribed. Follow-up with your physician assistant. Return to ER for worsening symptoms.

## 2023-02-06 NOTE — ED PROVIDER NOTES
[facility] COMPLAINT    Chief Complaint   Patient presents with    Shortness of Breath     Pt reports feeling short of breath for 1 week. Worsens with exertion. HPI    Brandan Renteria is a 50 y.o. female who presents to the emergency room for evaluation of SOB with associated cough for the past 1 week. She states it is worse with exertion and lying down. No chest pain or other symptoms. No history of cardiac disease or CHF or emphysema. She is a smoker. PAST MEDICAL HISTORY    Past Medical History:   Diagnosis Date    Headache(784.0)     Hypertension        SURGICAL HISTORY    Past Surgical History:   Procedure Laterality Date    HYSTERECTOMY (CERVIX STATUS UNKNOWN)         CURRENT MEDICATIONS    Current Outpatient Rx   Medication Sig Dispense Refill    cetirizine (ZYRTEC) 10 MG tablet Take 10 mg by mouth daily      levoFLOXacin (LEVAQUIN) 500 MG tablet Take 1 tablet by mouth daily for 10 days 10 tablet 0    propranolol (INDERAL) 10 MG tablet Take 10 mg by mouth 2 times daily      ondansetron (ZOFRAN) 4 MG tablet Take 4 mg by mouth every 8 hours as needed      naproxen (NAPROSYN) 375 MG tablet Take 375 mg by mouth 2 times daily as needed (Patient not taking: Reported on 2/6/2023)      metoprolol succinate (TOPROL XL) 100 MG extended release tablet Take 100 mg by mouth daily      methocarbamol (ROBAXIN) 750 MG tablet Take 750 mg by mouth 3 times daily as needed      linaCLOtide (LINZESS) 72 MCG CAPS capsule Take 72 mcg by mouth      famotidine (PEPCID) 20 MG tablet Take 20 mg by mouth 2 times daily      amphetamine-dextroamphetamine (ADDERALL XR) 25 MG extended release capsule Take 25 mg by mouth daily.       albuterol sulfate  (90 Base) MCG/ACT inhaler Inhale 2 puffs into the lungs every 6 hours as needed      estradiol (ESTRACE) 0.5 MG tablet Take 2 mg by mouth daily      ketorolac (TORADOL) 10 MG tablet Take 1 tablet by mouth every 8 hours as needed for Pain 15 tablet 0    butalbital-acetaminophen-caffeine (FIORICET, ESGIC) -40 MG per tablet Take 2 tablets by mouth every 4 hours as needed for Pain. ALLERGIES    Allergies   Allergen Reactions    Fentanyl Rash    Adhesive Tape Itching    Diphenhydramine Other (See Comments)       FAMILY HISTORY    History reviewed. No pertinent family history. SOCIAL HISTORY    Social History     Socioeconomic History    Marital status:      Spouse name: None    Number of children: None    Years of education: None    Highest education level: None   Tobacco Use    Smoking status: Every Day     Packs/day: 0.50     Types: Cigarettes    Smokeless tobacco: Never   Substance and Sexual Activity    Alcohol use: Never    Drug use: Never           Review of Systems:  Constitutional:  Denies fever, chills, weight loss or weakness   Eyes:  Denies photophobia or discharge   HENT:  Denies sore throat or ear pain   Respiratory: Reports cough and SOB. Cardiovascular:  Denies chest pain, palpitations or swelling   GI:  Denies abdominal pain, nausea, vomiting, or diarrhea   Musculoskeletal:  Denies back pain   Skin:  Denies rash   Neurologic:  Denies headache, focal weakness or sensory changes   Endocrine:  Denies polyuria or polydypsia   Lymphatic:  Denies swollen glands   Psychiatric:  Denies depression, suicidal ideation or homicidal ideation   All systems negative except as marked. PHYSICAL EXAM    VITAL SIGNS: /76   Pulse 67   Temp 98.6 °F (37 °C) (Oral)   Resp 19   Ht 5' 6\" (1.676 m)   Wt 195 lb (88.5 kg)   SpO2 96%   BMI 31.47 kg/m²    Constitutional:  Well developed, Well nourished, No acute distress, Non-toxic appearance. HENT:  Normocephalic, Atraumatic, Bilateral external ears normal, Oropharynx moist, No oral exudates, Nose normal. Neck- Normal range of motion, No tenderness, Supple, No stridor. Eyes:  PERRL, EOMI, Conjunctiva normal, No discharge.    Respiratory:  Normal breath sounds, No respiratory distress, No wheezing, No chest tenderness. Cardiovascular:  Normal heart rate, Normal rhythm, No murmurs, No rubs, No gallops. GI:  Bowel sounds normal, Soft, No tenderness, No masses, No pulsatile masses. Integument:  Warm, Dry, No erythema, No rash. Lymphatic:  No lymphadenopathy noted. Neurologic:  Alert & oriented x 3, Normal motor function, Normal sensory function, No focal deficits noted. Labs  Labs Reviewed   CBC WITH AUTO DIFFERENTIAL - Abnormal; Notable for the following components:       Result Value    Seg Neutrophils 44 (*)     Monocytes 10 (*)     Eosinophils % 20 (*)     Absolute Eos # 1.92 (*)     All other components within normal limits   COMPREHENSIVE METABOLIC PANEL - Abnormal; Notable for the following components:    Glucose 135 (*)     Alkaline Phosphatase 110 (*)     Total Bilirubin 0.1 (*)     All other components within normal limits   TROPONIN - Abnormal; Notable for the following components:    Troponin, High Sensitivity 21 (*)     All other components within normal limits   TROPONIN - Abnormal; Notable for the following components:    Troponin, High Sensitivity 20 (*)     All other components within normal limits   D-DIMER, QUANTITATIVE       EKG Interpretation    Interpreted by emergency department physician    Rhythm: normal sinus   Rate: normal  Axis: normal  Ectopy: none  Conduction: normal  ST Segments: no acute change  T Waves: no acute change  Q Waves: none    Clinical Impression: no acute changes    Corby Segura DO    MDM: Patient presents for evaluation of shortness of breath for the past 1 week. Physical exam is normal.  Vital signs stable. Laboratory studies were independently reviewed by me and normal including 2 troponins and a D-dimer and white blood cell. Chest x-ray was independently reviewed by me and shows possible left lower lobe pneumonia. No evidence of sepsis at this time.   Differential diagnosis includes CHF versus ACS versus PE versus pneumonia. At this time patient will be discharged home with a prescription for Levaquin 500 mg daily for the next 10 days and follow-up with her doctor. Summation      Patient Course:     ED Medications administered this visit:  Medications - No data to display    New Prescriptions from this visit:    New Prescriptions    LEVOFLOXACIN (LEVAQUIN) 500 MG TABLET    Take 1 tablet by mouth daily for 10 days       Follow-up:  YENNY Nye  [facility] [id number]    Schedule an appointment as soon as possible for a visit         Final Impression:   1.  Pneumonia of left lower lobe due to infectious organism New Problem              (Please note that portions of this note were completed with a voice recognition program.  Efforts were made to edit the dictations but occasionally words are mis-transcribed.)         Shayne Gordon,   02/06/23 [id number]

## 2023-02-07 LAB
EKG ATRIAL RATE: 67 BPM
EKG P AXIS: 65 DEGREES
EKG P-R INTERVAL: 150 MS
EKG Q-T INTERVAL: 412 MS
EKG QRS DURATION: 78 MS
EKG QTC CALCULATION (BAZETT): 435 MS
EKG R AXIS: 34 DEGREES
EKG T AXIS: 40 DEGREES
EKG VENTRICULAR RATE: 67 BPM

## 2023-02-07 PROCEDURE — 93010 ELECTROCARDIOGRAM REPORT: CPT | Performed by: INTERNAL MEDICINE

## 2023-02-16 ENCOUNTER — TRANSCRIBE ORDERS (OUTPATIENT)
Dept: ADMINISTRATIVE | Age: 48
End: 2023-02-16

## 2023-02-16 DIAGNOSIS — R06.00 DYSPNEA, UNSPECIFIED TYPE: Primary | ICD-10-CM

## 2023-02-20 ENCOUNTER — HOSPITAL ENCOUNTER (OUTPATIENT)
Dept: PULMONOLOGY | Age: 48
Discharge: HOME | End: 2023-02-20
Payer: COMMERCIAL

## 2023-02-20 VITALS — OXYGEN SATURATION: 98 %

## 2023-02-20 DIAGNOSIS — R06.00 DYSPNEA, UNSPECIFIED TYPE: ICD-10-CM

## 2023-02-20 PROCEDURE — 94060 EVALUATION OF WHEEZING: CPT

## 2023-02-20 PROCEDURE — 94726 PLETHYSMOGRAPHY LUNG VOLUMES: CPT

## 2023-02-20 PROCEDURE — 6360000002 HC RX W HCPCS: Performed by: INTERNAL MEDICINE

## 2023-02-20 PROCEDURE — 94729 DIFFUSING CAPACITY: CPT

## 2023-02-20 RX ORDER — ALBUTEROL SULFATE 2.5 MG/3ML
2.5 SOLUTION RESPIRATORY (INHALATION) ONCE
Status: COMPLETED | OUTPATIENT
Start: 2023-02-20 | End: 2023-02-20

## 2023-02-20 RX ADMIN — ALBUTEROL SULFATE 2.5 MG: 2.5 SOLUTION RESPIRATORY (INHALATION) at 14:14

## 2023-02-21 PROCEDURE — 94729 DIFFUSING CAPACITY: CPT | Performed by: INTERNAL MEDICINE

## 2023-02-21 PROCEDURE — 94060 EVALUATION OF WHEEZING: CPT | Performed by: INTERNAL MEDICINE

## 2023-02-21 PROCEDURE — 94726 PLETHYSMOGRAPHY LUNG VOLUMES: CPT | Performed by: INTERNAL MEDICINE

## 2023-02-21 NOTE — PROCEDURES
[address]                               PULMONARY FUNCTION    PATIENT NAME: Jazmín Garcia                   :        1975  MED REC NO:   [id number]                              ROOM:  ACCOUNT NO:   [de-identified]                           ADMIT DATE: 2023  PROVIDER:     Deepak Rios    DATE OF PROCEDURE:  2023    PULMONARY FUNCTION TESTS WITH BRONCHODILATOR AND DLCO    ATTENDING PHYSICIAN:  Dr Yusuf Thomson. REASON FOR TEST:  Dyspnea. SUMMARY:  1. The patient's effort was reported as good. 2.  The forced vital capacity is decreased at 67% of predicted. 3.  The forced exhaled volume in 1 second is decreased at 67% of  predicted. 4.  The forced exhaled volume in 1 second/forced vital capacity is at  99% of predicted. 5.  The forced exhaled flow at 25% to 75% is decreased at 64% of  predicted. 6.  A borderline improvement of 11% was seen in the forced exhaled flow  at 25% to 75% after one-time dose of bronchodilator. An improvement in  the forced vital capacity and forced exhaled volume in 1 second was not  seen. 7.  Total lung capacity is normal at 84% of predicted. 8.  The residual volume/total lung capacity is increased at 129% of  predicted. 9.  The DLCO is at 68% of predicted, the DLCO/VA is at 86% of predicted. IMPRESSION:  1.  Pulmonary function test results suggest a moderately obstructive  pulmonary disease (emphysema). 2.  A significant improvement was seen only in the forced exhaled flow  at 25% to 75% after one-time dose of bronchodilator to suggest a  possible small airway disease (asthma), please correlate clinically. 3.  An increased residual volume/total lung capacity suggests air  trapping. 4.  The DLCO is mildly decreased.         MEGHAN RIOS    D: 2023 17:06:28       T: 2023 22:11:55     [id number]  Job#: [id number] Doc#: [id number]    CC:

## 2024-03-15 ENCOUNTER — HOSPITAL ENCOUNTER (OUTPATIENT)
Dept: MRI IMAGING | Age: 49
Discharge: HOME | End: 2024-03-15
Payer: COMMERCIAL

## 2024-03-15 DIAGNOSIS — G60.8 OTHER HEREDITARY AND IDIOPATHIC NEUROPATHIES: ICD-10-CM

## 2024-03-15 PROCEDURE — 72148 MRI LUMBAR SPINE W/O DYE: CPT
